# Patient Record
Sex: FEMALE | Race: WHITE | URBAN - METROPOLITAN AREA
[De-identification: names, ages, dates, MRNs, and addresses within clinical notes are randomized per-mention and may not be internally consistent; named-entity substitution may affect disease eponyms.]

---

## 2017-04-05 ENCOUNTER — OFFICE (OUTPATIENT)
Dept: URBAN - METROPOLITAN AREA CLINIC 33 | Facility: CLINIC | Age: 82
End: 2017-04-05
Payer: COMMERCIAL

## 2017-04-05 VITALS
DIASTOLIC BLOOD PRESSURE: 57 MMHG | TEMPERATURE: 97.7 F | HEART RATE: 77 BPM | HEIGHT: 63 IN | SYSTOLIC BLOOD PRESSURE: 124 MMHG | WEIGHT: 136 LBS

## 2017-04-05 DIAGNOSIS — K21.9 GASTRO-ESOPHAGEAL REFLUX DISEASE WITHOUT ESOPHAGITIS: ICD-10-CM

## 2017-04-05 DIAGNOSIS — K26.0 ACUTE DUODENAL ULCER WITH HEMORRHAGE: ICD-10-CM

## 2017-04-05 DIAGNOSIS — I50.9 HEART FAILURE, UNSPECIFIED: ICD-10-CM

## 2017-04-05 PROCEDURE — 99214 OFFICE O/P EST MOD 30 MIN: CPT

## 2017-04-05 RX ORDER — PANTOPRAZOLE SODIUM 40 MG/1
40 TABLET, DELAYED RELEASE ORAL
Qty: 90 | Refills: 3 | Status: ACTIVE
Start: 2016-03-01

## 2018-06-15 ENCOUNTER — OFFICE (OUTPATIENT)
Dept: URBAN - METROPOLITAN AREA CLINIC 33 | Facility: CLINIC | Age: 83
End: 2018-06-15
Payer: MEDICARE

## 2018-06-15 VITALS
DIASTOLIC BLOOD PRESSURE: 59 MMHG | SYSTOLIC BLOOD PRESSURE: 127 MMHG | WEIGHT: 135 LBS | HEIGHT: 63 IN | TEMPERATURE: 97.7 F | HEART RATE: 75 BPM

## 2018-06-15 DIAGNOSIS — Z79.01 LONG TERM (CURRENT) USE OF ANTICOAGULANTS: ICD-10-CM

## 2018-06-15 DIAGNOSIS — I48.0 PAROXYSMAL ATRIAL FIBRILLATION: ICD-10-CM

## 2018-06-15 DIAGNOSIS — I50.9 HEART FAILURE, UNSPECIFIED: ICD-10-CM

## 2018-06-15 DIAGNOSIS — K21.9 GASTRO-ESOPHAGEAL REFLUX DISEASE WITHOUT ESOPHAGITIS: ICD-10-CM

## 2018-06-15 PROCEDURE — 99214 OFFICE O/P EST MOD 30 MIN: CPT

## 2018-06-15 RX ORDER — PANTOPRAZOLE SODIUM 40 MG/1
40 TABLET, DELAYED RELEASE ORAL
Qty: 90 | Refills: 3 | Status: ACTIVE
Start: 2016-03-01

## 2018-06-15 NOTE — SERVICEHPINOTES
ANNA HERNANDEZ   is a   83  female who presents for annual GERD f/u and rx refill. She reports Pantoprazole 40 mg qd provides well control of GERD as evident by no breakthrough symptoms in the day or night time. She mentions having a hiatal hernia and always sleeps with head of bed elevated. She eats a healthy diet and avoids spicy foods due to concern of return reflux. As long as she take the Pantoprazole she says her reflux does not occur. She has been taking Pantoprazole for about 3 years now and started it after a peptic ulcer was diagnosed many years ago. She reports recovering from pneumonia (no hospitalization) last month in May 2018 and currently only has a post nasal drip. She is seen by cardiologist twice a year for CHF by Dr Knight and next appointment is this Monday. Prior DEXA scan was many years ago. No h/o osteopenia or osteoporosis. She does not taking any calcium or vit D supplements. Denies nausea, vomiting, dysphagia, odyphagia, melena, blood in stool, change in bowel habits, weight loss. BR

## 2019-05-23 ENCOUNTER — OFFICE (OUTPATIENT)
Dept: URBAN - METROPOLITAN AREA CLINIC 33 | Facility: CLINIC | Age: 84
End: 2019-05-23
Payer: COMMERCIAL

## 2019-05-23 VITALS
TEMPERATURE: 97.9 F | HEIGHT: 63 IN | WEIGHT: 136 LBS | HEART RATE: 70 BPM | DIASTOLIC BLOOD PRESSURE: 57 MMHG | SYSTOLIC BLOOD PRESSURE: 132 MMHG

## 2019-05-23 DIAGNOSIS — K26.0 ACUTE DUODENAL ULCER WITH HEMORRHAGE: ICD-10-CM

## 2019-05-23 DIAGNOSIS — K21.9 GASTRO-ESOPHAGEAL REFLUX DISEASE WITHOUT ESOPHAGITIS: ICD-10-CM

## 2019-05-23 PROCEDURE — 99214 OFFICE O/P EST MOD 30 MIN: CPT

## 2019-05-23 RX ORDER — PANTOPRAZOLE SODIUM 20 MG/1
20 TABLET, DELAYED RELEASE ORAL
Qty: 30 | Refills: 5 | Status: COMPLETED
Start: 2019-05-23 | End: 2019-05-29

## 2019-05-23 NOTE — SERVICEHPINOTES
ANNA HERNANDEZ   is a   84  female who presents for follow up. Has been taking pantoprazole 40 mg once daily. Acid reflux is managed. Denies nausea, vomiting, dysphagia, dyspepsia, early satiety or weight loss. BRHx of duodenal ulcer in 2015. Hx of Afib and on Warfarin. BRBowel movements are fine daily. Denies blood in stool, melena or lower abdominal pain.BRLast colonoscopy was done at age 78. Last one per patient. BRDenies family hx of colon cancer. BRRecently seen by hematologist for anemia, not iron deficiency anemia per patient. BR

## 2020-09-08 ENCOUNTER — OFFICE (OUTPATIENT)
Dept: URBAN - METROPOLITAN AREA TELEHEALTH 7 | Facility: TELEHEALTH | Age: 85
End: 2020-09-08
Payer: COMMERCIAL

## 2020-09-08 VITALS — HEIGHT: 63 IN | WEIGHT: 130 LBS

## 2020-09-08 DIAGNOSIS — K21.9 GASTRO-ESOPHAGEAL REFLUX DISEASE WITHOUT ESOPHAGITIS: ICD-10-CM

## 2020-09-08 DIAGNOSIS — I48.0 PAROXYSMAL ATRIAL FIBRILLATION: ICD-10-CM

## 2020-09-08 DIAGNOSIS — I50.9 HEART FAILURE, UNSPECIFIED: ICD-10-CM

## 2020-09-08 PROCEDURE — 99441: CPT | Performed by: PHYSICIAN ASSISTANT

## 2020-09-08 RX ORDER — PANTOPRAZOLE SODIUM 40 MG/1
40 TABLET, DELAYED RELEASE ORAL
Qty: 90 | Refills: 3 | Status: ACTIVE
Start: 2016-03-01

## 2020-09-08 NOTE — SERVICEHPINOTES
PATIENT VERIFIED BY DATE OF BIRTH AND NAME. Patient has been consented for this telecommunication visit. Reviewed PmHx, FmHx, SHx. Ms. Chase is 86 yo female with h/o CHF, A-fib, GERD that presents for annual GERD visit. She has been taking pantoprazole 40 mg once daily for about 3-4 years that has been effective as evident by no breakthrough symptoms. Prior use of Pantoprazole 20 mg in 2019 attempted for about 1 month that did not help due to return of heartburn and regurgitation. Last EGD in 2016. Hx of duodenal ulcer in 2015. Denies n/v, heartburn, dysphagia, dyspepsia, early satiety, abdominal pain, melena, or weight loss. No other complaints. She is followed annually by cardiology due to h/o CHF and A-fib on Warfarin. BRLast colonoscopy was done at age 78. No fm hx of colon cancer or personal h/o polyps.BR

## 2021-08-19 ENCOUNTER — OFFICE (OUTPATIENT)
Dept: URBAN - METROPOLITAN AREA TELEHEALTH 7 | Facility: TELEHEALTH | Age: 86
End: 2021-08-19
Payer: COMMERCIAL

## 2021-08-19 VITALS — WEIGHT: 124 LBS | HEIGHT: 63 IN

## 2021-08-19 DIAGNOSIS — K21.9 GASTRO-ESOPHAGEAL REFLUX DISEASE WITHOUT ESOPHAGITIS: ICD-10-CM

## 2021-08-19 PROCEDURE — 99213 OFFICE O/P EST LOW 20 MIN: CPT | Mod: 95 | Performed by: PHYSICIAN ASSISTANT

## 2021-08-19 RX ORDER — PANTOPRAZOLE SODIUM 40 MG/1
40 TABLET, DELAYED RELEASE ORAL
Qty: 90 | Refills: 3 | Status: ACTIVE
Start: 2016-03-01

## 2021-08-19 NOTE — SERVICEHPINOTES
PATIENT VERIFIED BY DATE OF BIRTH AND NAME. Patient has been consented for this telecommunication visit. 85 yo female with h/o CHF, A-fib, GERD, PUD that presents for annual GERD visit. She is on oxygen 24-7 now due to recent worsened CHF issues. Otherwise she is doing pretty well. She has been taking pantoprazole 40 mg once daily for about 3-4 years that has been effective in controlling GERD. Prior use of Pantoprazole 20 mg in 2019 attempted for about 1 month but failed - she had return of heartburn and regurgitation. Last EGD in 2016. Hx of duodenal ulcer in 2015. Denies N/V, heartburn, dysphagia, abdominal pain, melena. Reports normal appetite and fairly stable weight. No other complaints. She is followed regularly by cardiology due to h/o CHF and A-fib on Warfarin.Last colonoscopy was done at age 78. No fm hx of colon cancer or personal h/o polyps.ROS as above, otherwise negative.BR

## 2022-07-26 ENCOUNTER — OFFICE (OUTPATIENT)
Dept: URBAN - METROPOLITAN AREA TELEHEALTH 7 | Facility: TELEHEALTH | Age: 87
End: 2022-07-26
Payer: COMMERCIAL

## 2022-07-26 VITALS — WEIGHT: 121 LBS | HEIGHT: 63 IN

## 2022-07-26 DIAGNOSIS — K21.9 GASTRO-ESOPHAGEAL REFLUX DISEASE WITHOUT ESOPHAGITIS: ICD-10-CM

## 2022-07-26 PROCEDURE — 99213 OFFICE O/P EST LOW 20 MIN: CPT | Mod: 95 | Performed by: PHYSICIAN ASSISTANT

## 2022-07-26 RX ORDER — PANTOPRAZOLE SODIUM 40 MG/1
40 TABLET, DELAYED RELEASE ORAL
Qty: 90 | Refills: 3 | Status: ACTIVE
Start: 2016-03-01

## 2022-07-26 NOTE — SERVICEHPINOTES
PATIENT VERIFIED BY DATE OF BIRTH AND NAME. Patient has been consented for this telecommunication visit.
br
br87 yo female with h/o CHF, A-fib, GERD, PUD that presents for annual GERD visit. She has been on oxygen 24-7 since 2021 due to recent worsened CHF issues. Currently has COVID. She has been taking pantoprazole 40 mg once daily for about 4-5 years that has been effective in controlling GERD. Prior use of Pantoprazole 20 mg in 2019 attempted for about 1 month but failed - she had return of heartburn and regurgitation. Reports her GERD has been well-controlled, though she has been noticing a lot of saliva after eating. Also has a lot of post-nasal drip right now. Hx of duodenal ulcer in 2015. Last EGD in 2016 without ulcers. Denies N/V, heartburn, dysphagia, abdominal pain, melena. No other complaints.She is followed regularly by cardiology due to h/o CHF and A-fib on Eliquis.Last colonoscopy was done at age 78. No fm hx of colon cancer or personal h/o polyps.ROS as above, otherwise negative.